# Patient Record
(demographics unavailable — no encounter records)

---

## 2025-03-14 NOTE — ASSESSMENT
[FreeTextEntry1] : ASSESSMENT: The patient comes in today with acute onset of right wrist pain subsequent to injury while playing basketball on 3-7.  At this point we have discussed concern for carpal fracture in the form of triquetrum.  Today he was placed in immobilization.  He is nonweightbearing.  We will obtain an MRI to further elucidate   The patient was adequately and thoroughly informed of my assessment of their current condition(s).  - This may diminish bodily function for the extremity. We discussed prognosis, tx modalities including operative and nonoperative options for the above diagnostic assessment. As always, 2nd opinion is always provided as an option.  When accessible, I was able to review other physicians note(s) including reviewing other tests, imaging results as well as personally view these results for my own interpretation.   The patient was adequately and thoroughly informed of my assessment of their current condition(s).  DISCUSSION: 1.  Right wrist concern for triquetral fracture.  Proper immobilization.  Nonweightbearing 2.  Follow-up 1 week x-ray right wrist.  Present with right wrist MRI 3. [x]

## 2025-03-14 NOTE — PHYSICAL EXAM
[de-identified] : Examination of the [right] wrist reveals mild effusion with tenderness at radiocarpal juncture more so at the level of the triquetrum.  There is ECU tenderness and swelling as well. No signs of infection.   [de-identified] :  [3] views of [right] wrist were obtained today in my office and were seen by me and discussed with the patient. These [show findings consistent with concern for triquetral fracture nondisplaced

## 2025-03-14 NOTE — HISTORY OF PRESENT ILLNESS
[FreeTextEntry1] : Lisandro is a pleasant 18-year-old male presenting today after sustaining an injury to the right wrist while playing basketball.  Date of injury 3-7.  Subsequently presented to urgent care following day.  It is inhibiting ADLs

## 2025-04-17 NOTE — ASSESSMENT
[FreeTextEntry1] : ASSESSMENT: The patient comes in today for follow up after sustaining an injury to the right wrist while playing basketball on 3/7/2025.  Subsequent to this he was seen in urgent care.  Pain is well-controlled today.  Unfortunately he presents today without right wrist brace.  He presents today with right wrist MRI which was thoroughly viewed and reviewed today with the patient.  We have discussed triquetrum contusion without discrete fracture line. There is no scaphoid fracture or scapholunate ligament tear thankfully.  We discussed the necessity of continued proper immobilization, nonweightbearing, and strict activity modifications. We have also discussed avoiding basketball at this time. In general with any fracture or contusion of the bone we have again discussed the risks of displacement, malunion, nonunion, chronic pain, and chronic deformity both with and without surgery, patient verbalizes understanding.  We have discussed commencing occupational therapy at this stage to avoid chronic stiffness.   The patient was adequately and thoroughly informed of my assessment of their current condition(s).  - This may diminish bodily function for the extremity.  We discussed prognosis, treatment modalities including operative and nonoperative options for the above diagnostic assessment. As always, 2nd opinion is always provided as an option. For this, when accessible, I was able to review other physicians note(s) including reviewing other tests, imaging results as well as personally view these results for my own interpretation.      The patient was adequately and thoroughly informed of my assessment of their current condition(s).   DISCUSSION: 1.  Continue proper immobilization, nonweightbearing, activity modifications.  OT prescription provided.  HEP. 2.  Right wrist MRI thoroughly viewed and reviewed with the patient. 3.  Follow-up in 2 weeks for repeat x-rays of the right wrist.

## 2025-04-17 NOTE — PHYSICAL EXAM
[de-identified] : Examination of the [right] wrist reveals improving swelling and tenderness at the level of the triquetrum bone. There is improving ECU tenderness.  He is able to make a full composite fist with very minimal stiffness. [de-identified] : [3] views of [right] wrist were obtained today in my office and were seen by me and discussed with the patient. These [show findings consistent with concern for triquetral fracture nondisplaced]  Right wrist MRI thoroughly viewed and reviewed with patient.  We have discussed contusion of the triquetrum bone.

## 2025-04-17 NOTE — HISTORY OF PRESENT ILLNESS
[FreeTextEntry1] : Lisandro is a pleasant 18-year-old male who presents today for follow up after sustaining an injury to the right wrist while playing basketball on 3/7/2025.  Subsequent to this he was seen in urgent care.  Pain is well-controlled today.  Unfortunately he presents today without right wrist brace.  He presents today with right wrist MRI.

## 2025-05-01 NOTE — HISTORY OF PRESENT ILLNESS
[FreeTextEntry1] : Lisandro is a pleasant 18-year-old male who presents today for follow up after sustaining an injury to the right wrist while playing basketball on 3/7/2025.  Subsequent to this he was seen in urgent care.  Pain is well-controlled today.

## 2025-05-01 NOTE — ASSESSMENT
[FreeTextEntry1] : ASSESSMENT: The patient comes in today for follow up after sustaining an injury to the right wrist while playing basketball on 3/7/2025 complicated by contusion of the triquetrum bone.  Right wrist MRI thoroughly viewed and reviewed with the patient.  We have discussed triquetrum contusion without discrete fracture line. There is no scaphoid fracture or scapholunate ligament tear thankfully.  Clinical examination and imaging reassuring today.  We have discussed the importance of continued activity modifications as well as OT and HEP to further optimize his outcome and to avoid chronic stiffness. In general with any fracture or contusion of the bone we have again discussed the risks of displacement, malunion, nonunion, chronic pain, and chronic deformity both with and without surgery, patient verbalizes understanding.   The patient was adequately and thoroughly informed of my assessment of their current condition(s).  - This may diminish bodily function for the extremity.  We discussed prognosis, treatment modalities including operative and nonoperative options for the above diagnostic assessment. As always, 2nd opinion is always provided as an option. For this, when accessible, I was able to review other physicians note(s) including reviewing other tests, imaging results as well as personally view these results for my own interpretation.      The patient was adequately and thoroughly informed of my assessment of their current condition(s).   DISCUSSION: 1.  Activity modifications. OT and HEP discussed. 2.  Right wrist MRI thoroughly viewed and reviewed with the patient. 3.  Follow-up in 3 weeks for repeat x-rays of the right wrist.

## 2025-05-01 NOTE — PHYSICAL EXAM
[de-identified] : Examination of the [right] wrist reveals improving swelling and tenderness at the level of the triquetrum bone. There is improving ECU tenderness.  He is able to make a full composite fist with very minimal to no stiffness. [de-identified] : [3] views of [right] wrist were obtained today in my office and were seen by me and discussed with the patient. These [show findings consistent with concern for triquetral fracture nondisplaced]  Right wrist MRI thoroughly viewed and reviewed with patient.  We have discussed contusion of the triquetrum bone.

## 2025-05-22 NOTE — PHYSICAL EXAM
[de-identified] : Examination of the [right] wrist reveals improving swelling and tenderness at the level of the triquetrum bone. There is improving ECU tenderness.  He is able to make a full composite fist with very minimal to no stiffness. [de-identified] : [3] views of [right] wrist were obtained today in my office and were seen by me and discussed with the patient. These [show findings consistent with concern for triquetral fracture nondisplaced]  Right wrist MRI thoroughly viewed and reviewed with patient.  We have discussed contusion of the triquetrum bone. AMS, hyperglycemia r/o DKA vs HHS

## 2025-05-22 NOTE — ASSESSMENT
[FreeTextEntry1] : ASSESSMENT: The patient comes in today for follow up after sustaining an injury to the right wrist while playing basketball on 3/7/2025 complicated by contusion of the triquetrum bone.  Right wrist MRI thoroughly viewed and reviewed with the patient.  We have discussed triquetrum contusion without discrete fracture line. There is no scaphoid fracture or scapholunate ligament tear thankfully.  Clinical examination and imaging reassuring today.  We have discussed the importance of continued activity modifications as well as OT and HEP to further optimize his outcome and to avoid chronic stiffness. In general with any fracture or contusion of the bone we have again discussed the risks of displacement, malunion, nonunion, chronic pain, and chronic deformity both with and without surgery, patient verbalizes understanding.   The patient was adequately and thoroughly informed of my assessment of their current condition(s).  - This may diminish bodily function for the extremity.  We discussed prognosis, treatment modalities including operative and nonoperative options for the above diagnostic assessment. As always, 2nd opinion is always provided as an option. For this, when accessible, I was able to review other physicians note(s) including reviewing other tests, imaging results as well as personally view these results for my own interpretation.      The patient was adequately and thoroughly informed of my assessment of their current condition(s).   DISCUSSION: 1.  Activity modifications.  Continue OT and HEP. 2.  Right wrist MRI thoroughly viewed and reviewed with the patient. 3.  Follow-up in 1 month for final repeat x-rays of the right wrist.